# Patient Record
(demographics unavailable — no encounter records)

---

## 2024-11-22 NOTE — PHYSICAL EXAM
[Ankle Swelling (On Exam)] : not present [Varicose Veins Of Lower Extremities] : not present [] : not present [Delayed in the Right Toes] : capillary refills normal in right toes [Delayed in the Left Toes] : capillary refills normal in the left toes [FreeTextEntry3] : Hair growth noted on digits. Proximal to distal cooling is within normal limits.  [de-identified] : Normal anterior drawer. Good stability. No pain high up on the syndesmosis or high leg. She has pain in all three lateral ankle ligaments and to a lesser degree at the medial ankle. All tendons are noted to be intact. [FreeTextEntry1] : Mild swelling and mild bruising at the lateral ankle on the right side.

## 2024-11-22 NOTE — PHYSICAL EXAM
[Ankle Swelling (On Exam)] : not present [Varicose Veins Of Lower Extremities] : not present [] : not present [Delayed in the Right Toes] : capillary refills normal in right toes [Delayed in the Left Toes] : capillary refills normal in the left toes [FreeTextEntry3] : Hair growth noted on digits. Proximal to distal cooling is within normal limits.  [de-identified] : Normal anterior drawer. Good stability. No pain high up on the syndesmosis or high leg. She has pain in all three lateral ankle ligaments and to a lesser degree at the medial ankle. All tendons are noted to be intact. [FreeTextEntry1] : Mild swelling and mild bruising at the lateral ankle on the right side.

## 2024-11-22 NOTE — HISTORY OF PRESENT ILLNESS
[FreeTextEntry1] : Patient presents today because she stepped off a curb and twisted her right ankle. She had twisted it in the past and now it has become painful and swollen with some bruising. She presents today with mild to moderate swelling and is limping.

## 2024-11-22 NOTE — PHYSICAL EXAM
[Ankle Swelling (On Exam)] : not present [Varicose Veins Of Lower Extremities] : not present [Delayed in the Right Toes] : capillary refills normal in right toes [] : not present [Delayed in the Left Toes] : capillary refills normal in the left toes [FreeTextEntry3] : Hair growth noted on digits. Proximal to distal cooling is within normal limits.  [de-identified] : Normal anterior drawer. Good stability. No pain high up on the syndesmosis or high leg. She has pain in all three lateral ankle ligaments and to a lesser degree at the medial ankle. All tendons are noted to be intact. [FreeTextEntry1] : Mild swelling and mild bruising at the lateral ankle on the right side.

## 2024-11-22 NOTE — PROCEDURE
[FreeTextEntry1] : X-rays taken to evaluate for fracture or bony irregularity. X-ray Report: (Right ankle - 3 views) X-rays demonstrate no sign of fracture or dislocation. No sign of osteochondritis dissecans. Congruous ankle mortis.

## 2024-11-22 NOTE — ASSESSMENT
[FreeTextEntry1] : Impression: Grade II ankle sprain, right. Pain.  Treatment:  I want the patient to rest, ice and elevate. I gave her an Ace wrap. she could take Advil with food for the next 2 days. I referred her to [facility] for a CAM boot. The goal of the device is to improve mobility, improve lower extremity stability, decrease pain and facilitate bony healing.  She will follow-up in the office with continued pain that persists, otherwise I will see her in 3 weeks.